# Patient Record
(demographics unavailable — no encounter records)

---

## 2025-01-23 NOTE — DISCUSSION/SUMMARY
[FreeTextEntry1] : 1.  No additional cardiac testing at this time. 2.  Continue simvastatin 20 mg daily.   3.  Monitor BP at home, keep a log and bring to f/u. 4.  Patient encouraged to work on a healthy diet high in lean protein, whole grains and vegetables, and lower in white flour and simple sugars. 5.  Patient is encouraged to exercise at least 30 minutes a day every day of the week. 6.  Follow up here in one year or as needed. [EKG obtained to assist in diagnosis and management of assessed problem(s)] : EKG obtained to assist in diagnosis and management of assessed problem(s)

## 2025-01-23 NOTE — HISTORY OF PRESENT ILLNESS
[FreeTextEntry1] : Patient presents back to the office today feeling very well and offering no complaints other than recently having issues with bronchitis.  She otherwise has been active and is trying to exercise more but says she has not been as much in the cold weather.  She does not have any symptoms when she exerts self.  Patient denies chest pain, shortness of breath, palpitations, orthopnea, presyncope, syncope.

## 2025-01-23 NOTE — PHYSICAL EXAM
[Well Developed] : well developed [Well Nourished] : well nourished [No Acute Distress] : no acute distress [Normal Conjunctiva] : normal conjunctiva [Normal Venous Pressure] : normal venous pressure [No Carotid Bruit] : no carotid bruit [Normal S1, S2] : normal S1, S2 [No Rub] : no rub [No Gallop] : no gallop [Murmur] : murmur [Clear Lung Fields] : clear lung fields [Good Air Entry] : good air entry [No Respiratory Distress] : no respiratory distress  [Soft] : abdomen soft [Non Tender] : non-tender [No Masses/organomegaly] : no masses/organomegaly [Normal Bowel Sounds] : normal bowel sounds [Normal Gait] : normal gait [No Edema] : no edema [No Cyanosis] : no cyanosis [No Clubbing] : no clubbing [No Varicosities] : no varicosities [Moves all extremities] : moves all extremities [No Focal Deficits] : no focal deficits [Normal Speech] : normal speech [Alert and Oriented] : alert and oriented [Normal memory] : normal memory [de-identified] : 2/6 systolic murmur at the apex

## 2025-01-23 NOTE — ASSESSMENT
[FreeTextEntry1] : Holter monitor March 26, 2024 demonstrated presenting rhythm of sinus with an average heart rate of 83 bpm, maximum 108, minimum 51 bpm.  PVCs totaling 5.4% of all beats were noted with some bigeminy, trigeminy and 12 couplets.  A total of 271/h.  Rare PACs.  No other significant arrhythmia.  Exercise stress test May 8, 2024 during which the patient exercised via a Mushtaq protocol for 9 minutes and 22 seconds, totaling 10 METS.  Peak heart rate achieved was 157 bpm, representing 109% of age-predicted maximum.  Blood pressure was elevated at baseline at 154/84 with a normal response to exercise.  EKG showed no evidence of ischemia or arrhythmia with exercise.  Echocardiogram April 17, 2024 demonstrated left ventricle normal size and function with ejection fraction of 60 to 65%.  Mild mitral and tricuspid regurgitation.  Mild aortic stenosis.  Carotid Doppler April 17, 2024 showed mild plaque bilaterally with mild bilateral stenosis.  Event monitor scheduled for 1 week but which only reported 1 day of data showed all sinus rhythm with some PACs.  No arrhythmias noted.  24-hour blood pressure monitor September 23, 2024 showed a 24-hour average blood pressure of 148/85, average while awake 151/88, average while asleep 143/78.  EKG: Sinus rhythm with no significant ST or T wave changes. Single PVC noted.  76-year-old female with past medical history of dyslipidemia, PVCs who presents for follow-up.  Patient remains asymptomatic at this time. EKG today shows only a single PVC and she is not symptomatic of that.  Blood pressure does appear to be well-controlled at this time but I have encouraged her to keep an eye on it at home.  Recent blood work showed borderline control of her lipids.  I will not make any changes to medication for today.

## 2025-06-09 NOTE — IMAGING
[de-identified] : Cervical exam shows no skin change or bony deformity. ROM: full and pain free Spurling Signs: negative Palencia Signs: negative Facet Joint Compression Signs: negative There is paracervical spasm noted. DTRs: cannot assess There is no TTP Gait examination: normal Pt is able to perform Tandem Gait. shoulder examination: with full and pain free ROM / no ttp / no ligamentous laxity / negative impingement signs no palpable deformity to the right upper extremity upper extremities with normal sensation symmetrically.   limited function of the right elbow, wrist and hand 1/5 elbow flexion and extension strength 1/5 hand ( and intrinsic strength) mild diffuse right hand swelling All digits with spontaneous capillary refill no ttp over the right upper extremity no facial droop pt is in no apparent distress    Right wrist mri performed at [facility] and [facility] showed: diffuse synovitis / oa / dorsal ganglion cyst.   MRI performed 06/09/2025 of the right brachial plexus showed no definitive pathology upon review (radiology report pending)

## 2025-06-09 NOTE — HISTORY OF PRESENT ILLNESS
[de-identified] : 6/9/25: started 5 days ago- strange sensation in the right arm- feels heavy.   reviewed notes from [facility]:06/08/25: Patient is rhd 76 years old and is here to be evaluated and treated for the RIGHT WRIST weakness, which has been present x 5-6 days. Pt states there is no hx of trauma. Patient states she has a ganglion cyst on the right wrist and that her patient's PCP states could be pushing down on a nerve.  Ms. Romano has an MRI that was performed at [facility] recently.  Patient states she has noted swelling loss of function in the hand.  Patient has been using ice, compression, and rest to help with the discomfort.  PMH: hyperlipidemia Allergies: NKDA

## 2025-06-09 NOTE — ASSESSMENT
[FreeTextEntry1] : The patient was advised of the diagnosis. The natural history of the pathology was explained in full to the patient in layman's terms. All questions were answered. The risks and benefits of surgical and non-surgical treatment alternatives were explained in full to the patient.  continue right wrist brace and sling for comfort  EMG scheduled for tomorrow  continue prednisone taper pack rx provided.  Pt referred to Neurology today for consultation. Pt will be seeing him later today

## 2025-06-13 NOTE — PHYSICAL EXAM
[Well Developed] : well developed [Well Nourished] : well nourished [No Acute Distress] : no acute distress [Normal Conjunctiva] : normal conjunctiva [Normal Venous Pressure] : normal venous pressure [No Carotid Bruit] : no carotid bruit [Normal S1, S2] : normal S1, S2 [No Rub] : no rub [No Gallop] : no gallop [Murmur] : murmur [Clear Lung Fields] : clear lung fields [Good Air Entry] : good air entry [No Respiratory Distress] : no respiratory distress  [Soft] : abdomen soft [Non Tender] : non-tender [No Masses/organomegaly] : no masses/organomegaly [Normal Bowel Sounds] : normal bowel sounds [Normal Gait] : normal gait [No Edema] : no edema [No Cyanosis] : no cyanosis [No Clubbing] : no clubbing [No Varicosities] : no varicosities [Moves all extremities] : moves all extremities [No Focal Deficits] : no focal deficits [Normal Speech] : normal speech [Alert and Oriented] : alert and oriented [Normal memory] : normal memory [de-identified] : 2/6 systolic murmur at the apex

## 2025-06-13 NOTE — PHYSICAL EXAM
[Well Developed] : well developed [Well Nourished] : well nourished [No Acute Distress] : no acute distress [Normal Conjunctiva] : normal conjunctiva [Normal Venous Pressure] : normal venous pressure [No Carotid Bruit] : no carotid bruit [Normal S1, S2] : normal S1, S2 [No Rub] : no rub [No Gallop] : no gallop [Murmur] : murmur [Clear Lung Fields] : clear lung fields [Good Air Entry] : good air entry [No Respiratory Distress] : no respiratory distress  [Soft] : abdomen soft [Non Tender] : non-tender [No Masses/organomegaly] : no masses/organomegaly [Normal Bowel Sounds] : normal bowel sounds [Normal Gait] : normal gait [No Edema] : no edema [No Cyanosis] : no cyanosis [No Clubbing] : no clubbing [No Varicosities] : no varicosities [Moves all extremities] : moves all extremities [No Focal Deficits] : no focal deficits [Normal Speech] : normal speech [Alert and Oriented] : alert and oriented [Normal memory] : normal memory [de-identified] : 2/6 systolic murmur at the apex

## 2025-06-13 NOTE — HISTORY OF PRESENT ILLNESS
[FreeTextEntry1] : Patient presents back to the office today feeling  She states she had a stroke  She otherwise has been active and is trying to exercise more but says she has not been as much in the cold weather.  She does not have any symptoms when she exerts self.  Patient denies chest pain, shortness of breath, palpitations, orthopnea, presyncope, syncope.

## 2025-06-13 NOTE — DISCUSSION/SUMMARY
[FreeTextEntry1] : 1.  No additional cardiac testing at this time. 2.  Continue simvastatin 20 mg daily.   3.  Monitor BP at home, keep a log and bring to f/u. 4.  Patient encouraged to work on a healthy diet high in lean protein, whole grains and vegetables, and lower in white flour and simple sugars. 5.  Patient is encouraged to exercise at least 30 minutes a day every day of the week. 6.  Follow up here in one year or as needed.